# Patient Record
Sex: FEMALE | Race: WHITE
[De-identification: names, ages, dates, MRNs, and addresses within clinical notes are randomized per-mention and may not be internally consistent; named-entity substitution may affect disease eponyms.]

---

## 2018-08-02 ENCOUNTER — HOSPITAL ENCOUNTER (EMERGENCY)
Dept: HOSPITAL 12 - ER | Age: 78
Discharge: ANOTHER HOSPITAL | End: 2018-08-02
Payer: COMMERCIAL

## 2018-08-02 VITALS — HEIGHT: 68 IN | WEIGHT: 110 LBS | BODY MASS INDEX: 16.67 KG/M2

## 2018-08-02 DIAGNOSIS — Z88.0: ICD-10-CM
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
## 2018-08-02 DIAGNOSIS — Z86.79: ICD-10-CM

## 2018-08-02 DIAGNOSIS — I10: ICD-10-CM

## 2018-08-02 DIAGNOSIS — S10.83XA: Primary | ICD-10-CM
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
## 2018-08-02 DIAGNOSIS — S80.02XA: ICD-10-CM
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
## 2018-08-02 DIAGNOSIS — R55: ICD-10-CM

## 2018-08-02 LAB
BASOPHILS # BLD AUTO: 0.1 K/UL (ref 0–8)
BASOPHILS NFR BLD AUTO: 0.7 % (ref 0–2)
BUN SERPL-MCNC: 16 MG/DL (ref 7–18)
CHLORIDE SERPL-SCNC: 103 MMOL/L (ref 98–107)
CO2 SERPL-SCNC: 27 MMOL/L (ref 21–32)
CREAT SERPL-MCNC: 0.9 MG/DL (ref 0.6–1.3)
EOSINOPHIL # BLD AUTO: 0.1 K/UL (ref 0–0.7)
EOSINOPHIL NFR BLD AUTO: 0.6 % (ref 0–7)
GLUCOSE SERPL-MCNC: 109 MG/DL (ref 74–106)
HCT VFR BLD AUTO: 38.2 % (ref 31.2–41.9)
HGB BLD-MCNC: 13 G/DL (ref 10.9–14.3)
LYMPHOCYTES # BLD AUTO: 1 K/UL (ref 20–40)
LYMPHOCYTES NFR BLD AUTO: 8.4 % (ref 20.5–51.5)
MCH RBC QN AUTO: 31.6 UUG (ref 24.7–32.8)
MCHC RBC AUTO-ENTMCNC: 34 G/DL (ref 32.3–35.6)
MCV RBC AUTO: 92.9 FL (ref 75.5–95.3)
MONOCYTES # BLD AUTO: 0.9 K/UL (ref 2–10)
MONOCYTES NFR BLD AUTO: 7.9 % (ref 0–11)
NEUTROPHILS # BLD AUTO: 9.8 K/UL (ref 1.8–8.9)
NEUTROPHILS NFR BLD AUTO: 82.4 % (ref 38.5–71.5)
PLATELET # BLD AUTO: 218 K/UL (ref 179–408)
POTASSIUM SERPL-SCNC: 3.6 MMOL/L (ref 3.5–5.1)
RBC # BLD AUTO: 4.12 MIL/UL (ref 3.63–4.92)
WBC # BLD AUTO: 11.9 K/UL (ref 3.8–11.8)

## 2018-08-02 PROCEDURE — A4663 DIALYSIS BLOOD PRESSURE CUFF: HCPCS

## 2018-08-02 NOTE — NUR
PT WAS TRANSFERED TO [facility] VIA S [facility]UL[facility]. REPORT 
WAS GIVEN TO AMBULANCE RN AND TO [place] [facility] RN NIKOS.